# Patient Record
Sex: MALE | Race: WHITE | NOT HISPANIC OR LATINO | Employment: UNEMPLOYED | URBAN - METROPOLITAN AREA
[De-identification: names, ages, dates, MRNs, and addresses within clinical notes are randomized per-mention and may not be internally consistent; named-entity substitution may affect disease eponyms.]

---

## 2021-07-16 ENCOUNTER — APPOINTMENT (OUTPATIENT)
Dept: RADIOLOGY | Facility: CLINIC | Age: 51
End: 2021-07-16
Payer: COMMERCIAL

## 2021-07-16 ENCOUNTER — OFFICE VISIT (OUTPATIENT)
Dept: FAMILY MEDICINE CLINIC | Facility: CLINIC | Age: 51
End: 2021-07-16
Payer: COMMERCIAL

## 2021-07-16 VITALS
OXYGEN SATURATION: 96 % | DIASTOLIC BLOOD PRESSURE: 80 MMHG | BODY MASS INDEX: 27.16 KG/M2 | SYSTOLIC BLOOD PRESSURE: 110 MMHG | HEIGHT: 71 IN | RESPIRATION RATE: 16 BRPM | WEIGHT: 194 LBS | HEART RATE: 80 BPM | TEMPERATURE: 97.5 F

## 2021-07-16 DIAGNOSIS — M54.50 CHRONIC LOW BACK PAIN, UNSPECIFIED BACK PAIN LATERALITY, UNSPECIFIED WHETHER SCIATICA PRESENT: ICD-10-CM

## 2021-07-16 DIAGNOSIS — Z12.11 SCREENING FOR COLON CANCER: ICD-10-CM

## 2021-07-16 DIAGNOSIS — Z13.83 SCREENING FOR CARDIOVASCULAR, RESPIRATORY, AND GENITOURINARY DISEASES: ICD-10-CM

## 2021-07-16 DIAGNOSIS — G89.29 CHRONIC LOW BACK PAIN, UNSPECIFIED BACK PAIN LATERALITY, UNSPECIFIED WHETHER SCIATICA PRESENT: ICD-10-CM

## 2021-07-16 DIAGNOSIS — Z23 NEED FOR VACCINATION: ICD-10-CM

## 2021-07-16 DIAGNOSIS — Z13.29 SCREENING FOR THYROID DISORDER: ICD-10-CM

## 2021-07-16 DIAGNOSIS — Z00.00 WELL ADULT EXAM: Primary | ICD-10-CM

## 2021-07-16 DIAGNOSIS — Z13.6 SCREENING FOR CARDIOVASCULAR, RESPIRATORY, AND GENITOURINARY DISEASES: ICD-10-CM

## 2021-07-16 DIAGNOSIS — M25.50 POLYARTHRALGIA: ICD-10-CM

## 2021-07-16 DIAGNOSIS — Z13.89 SCREENING FOR CARDIOVASCULAR, RESPIRATORY, AND GENITOURINARY DISEASES: ICD-10-CM

## 2021-07-16 DIAGNOSIS — Z11.59 ENCOUNTER FOR HEPATITIS C SCREENING TEST FOR LOW RISK PATIENT: ICD-10-CM

## 2021-07-16 DIAGNOSIS — Z11.4 SCREENING FOR HIV (HUMAN IMMUNODEFICIENCY VIRUS): ICD-10-CM

## 2021-07-16 PROBLEM — A69.20 LYME DISEASE: Status: ACTIVE | Noted: 2021-07-16

## 2021-07-16 PROBLEM — K29.50 CHRONIC GASTRITIS WITHOUT BLEEDING: Status: ACTIVE | Noted: 2021-07-16

## 2021-07-16 PROCEDURE — 90471 IMMUNIZATION ADMIN: CPT

## 2021-07-16 PROCEDURE — 72110 X-RAY EXAM L-2 SPINE 4/>VWS: CPT

## 2021-07-16 PROCEDURE — 90715 TDAP VACCINE 7 YRS/> IM: CPT

## 2021-07-16 PROCEDURE — 99386 PREV VISIT NEW AGE 40-64: CPT | Performed by: FAMILY MEDICINE

## 2021-07-20 LAB
ALBUMIN SERPL-MCNC: 4.6 G/DL (ref 3.6–5.1)
ALBUMIN/GLOB SERPL: 1.8 (CALC) (ref 1–2.5)
ALP SERPL-CCNC: 62 U/L (ref 35–144)
ALT SERPL-CCNC: 26 U/L (ref 9–46)
ANA SER QL IF: NEGATIVE
AST SERPL-CCNC: 16 U/L (ref 10–35)
BASOPHILS # BLD AUTO: 90 CELLS/UL (ref 0–200)
BASOPHILS NFR BLD AUTO: 1.2 %
BILIRUB SERPL-MCNC: 0.8 MG/DL (ref 0.2–1.2)
BUN SERPL-MCNC: 12 MG/DL (ref 7–25)
BUN/CREAT SERPL: NORMAL (CALC) (ref 6–22)
CALCIUM SERPL-MCNC: 9.3 MG/DL (ref 8.6–10.3)
CHLORIDE SERPL-SCNC: 105 MMOL/L (ref 98–110)
CHOLEST SERPL-MCNC: 203 MG/DL
CHOLEST/HDLC SERPL: 4.7 (CALC)
CO2 SERPL-SCNC: 27 MMOL/L (ref 20–32)
CREAT SERPL-MCNC: 0.94 MG/DL (ref 0.7–1.33)
EOSINOPHIL # BLD AUTO: 90 CELLS/UL (ref 15–500)
EOSINOPHIL NFR BLD AUTO: 1.2 %
ERYTHROCYTE [DISTWIDTH] IN BLOOD BY AUTOMATED COUNT: 13.2 % (ref 11–15)
GLOBULIN SER CALC-MCNC: 2.6 G/DL (CALC) (ref 1.9–3.7)
GLUCOSE SERPL-MCNC: 94 MG/DL (ref 65–99)
HCT VFR BLD AUTO: 44.6 % (ref 38.5–50)
HCV AB S/CO SERPL IA: <0.02
HCV AB SERPL QL IA: NORMAL
HDLC SERPL-MCNC: 43 MG/DL
HGB BLD-MCNC: 15 G/DL (ref 13.2–17.1)
HIV 1+2 AB+HIV1 P24 AG SERPL QL IA: NORMAL
LDLC SERPL CALC-MCNC: 131 MG/DL (CALC)
LYMPHOCYTES # BLD AUTO: 2205 CELLS/UL (ref 850–3900)
LYMPHOCYTES NFR BLD AUTO: 29.4 %
MCH RBC QN AUTO: 31.4 PG (ref 27–33)
MCHC RBC AUTO-ENTMCNC: 33.6 G/DL (ref 32–36)
MCV RBC AUTO: 93.3 FL (ref 80–100)
MONOCYTES # BLD AUTO: 540 CELLS/UL (ref 200–950)
MONOCYTES NFR BLD AUTO: 7.2 %
NEUTROPHILS # BLD AUTO: 4575 CELLS/UL (ref 1500–7800)
NEUTROPHILS NFR BLD AUTO: 61 %
NONHDLC SERPL-MCNC: 160 MG/DL (CALC)
PLATELET # BLD AUTO: 234 THOUSAND/UL (ref 140–400)
PMV BLD REES-ECKER: 10.6 FL (ref 7.5–12.5)
POTASSIUM SERPL-SCNC: 4.3 MMOL/L (ref 3.5–5.3)
PROT SERPL-MCNC: 7.2 G/DL (ref 6.1–8.1)
RBC # BLD AUTO: 4.78 MILLION/UL (ref 4.2–5.8)
RPR SER QL: NORMAL
SL AMB EGFR AFRICAN AMERICAN: 108 ML/MIN/1.73M2
SL AMB EGFR NON AFRICAN AMERICAN: 93 ML/MIN/1.73M2
SODIUM SERPL-SCNC: 138 MMOL/L (ref 135–146)
T4 FREE SERPL-MCNC: 0.9 NG/DL (ref 0.8–1.8)
TRIGL SERPL-MCNC: 155 MG/DL
TSH SERPL-ACNC: 3.19 MIU/L (ref 0.4–4.5)
WBC # BLD AUTO: 7.5 THOUSAND/UL (ref 3.8–10.8)

## 2021-07-23 ENCOUNTER — TELEPHONE (OUTPATIENT)
Dept: FAMILY MEDICINE CLINIC | Facility: CLINIC | Age: 51
End: 2021-07-23

## 2021-07-23 DIAGNOSIS — K58.9 IRRITABLE BOWEL SYNDROME, UNSPECIFIED TYPE: Primary | ICD-10-CM

## 2021-07-23 NOTE — TELEPHONE ENCOUNTER
----- Message from Israel Deutsch MD sent at 7/23/2021  4:28 PM EDT -----  Can we please let pt know that his xray back has some arthritic changes  I can place an order for him to see orthopedic spine doctor if he would like

## 2021-07-28 NOTE — TELEPHONE ENCOUNTER
I spoke with pt, he is aware of results  Pt wanted to know if this has anything to do with IBS  Per pt he feels he may have IBS  I asked him if he ever discussed this with you at a visit, per pt he stated no  I offered him an appointment to come in and discuss treatment options with you  Pt stated that when he read about IBS on line he noticed there was not much to do to treat this  Pt would like a call from you to discuss how he can proceed further with this   Inell VERNELL Harper

## 2021-07-28 NOTE — TELEPHONE ENCOUNTER
Tried calling patient, no answer and his voicemail box is full  If he does call back, I recommend follow up with GI for evaluation of IBS  I can place a referral in his chart for this if interested  There are management options to help control symptoms if he has IBS and his GI can discuss this in more detail

## 2021-07-29 NOTE — TELEPHONE ENCOUNTER
Spoke to patient, he is okay with seeing a gastroenterologist  Please add referral for GI and forward to clerical so they can mail the order to the patient  Thank you     Chuck Metz MA
